# Patient Record
Sex: FEMALE | Race: BLACK OR AFRICAN AMERICAN | Employment: FULL TIME | URBAN - METROPOLITAN AREA
[De-identification: names, ages, dates, MRNs, and addresses within clinical notes are randomized per-mention and may not be internally consistent; named-entity substitution may affect disease eponyms.]

---

## 2017-01-09 PROBLEM — E11.610 TYPE 2 DIABETES MELLITUS WITH DIABETIC NEUROPATHIC ARTHROPATHY, WITHOUT LONG-TERM CURRENT USE OF INSULIN (HCC): Status: ACTIVE | Noted: 2017-01-09

## 2017-12-09 PROCEDURE — 81001 URINALYSIS AUTO W/SCOPE: CPT | Performed by: INTERNAL MEDICINE

## 2017-12-09 PROCEDURE — 87077 CULTURE AEROBIC IDENTIFY: CPT | Performed by: INTERNAL MEDICINE

## 2017-12-09 PROCEDURE — 87086 URINE CULTURE/COLONY COUNT: CPT | Performed by: INTERNAL MEDICINE

## 2017-12-09 PROCEDURE — 87186 SC STD MICRODIL/AGAR DIL: CPT | Performed by: INTERNAL MEDICINE

## 2019-06-24 PROCEDURE — 88175 CYTOPATH C/V AUTO FLUID REDO: CPT | Performed by: INTERNAL MEDICINE

## 2025-07-28 ENCOUNTER — TELEPHONE (OUTPATIENT)
Dept: OTOLARYNGOLOGY | Facility: CLINIC | Age: 55
End: 2025-07-28

## 2025-07-28 ENCOUNTER — OFFICE VISIT (OUTPATIENT)
Dept: OTOLARYNGOLOGY | Facility: CLINIC | Age: 55
End: 2025-07-28

## 2025-07-28 ENCOUNTER — TELEPHONE (OUTPATIENT)
Dept: SURGERY | Facility: CLINIC | Age: 55
End: 2025-07-28

## 2025-07-28 DIAGNOSIS — H61.23 BILATERAL IMPACTED CERUMEN: Primary | ICD-10-CM

## 2025-07-28 DIAGNOSIS — J32.9 CHRONIC SINUSITIS, UNSPECIFIED LOCATION: ICD-10-CM

## 2025-07-28 DIAGNOSIS — R09.81 NASAL CONGESTION: ICD-10-CM

## 2025-07-28 DIAGNOSIS — N18.30 STAGE 3 CHRONIC KIDNEY DISEASE, UNSPECIFIED WHETHER STAGE 3A OR 3B CKD (HCC): ICD-10-CM

## 2025-07-28 PROCEDURE — 99204 OFFICE O/P NEW MOD 45 MIN: CPT | Performed by: STUDENT IN AN ORGANIZED HEALTH CARE EDUCATION/TRAINING PROGRAM

## 2025-07-28 PROCEDURE — 69210 REMOVE IMPACTED EAR WAX UNI: CPT | Performed by: STUDENT IN AN ORGANIZED HEALTH CARE EDUCATION/TRAINING PROGRAM

## 2025-07-28 PROCEDURE — 31231 NASAL ENDOSCOPY DX: CPT | Performed by: STUDENT IN AN ORGANIZED HEALTH CARE EDUCATION/TRAINING PROGRAM

## 2025-07-28 RX ORDER — DOXYCYCLINE 100 MG/1
100 CAPSULE ORAL 2 TIMES DAILY
Qty: 20 CAPSULE | Refills: 0 | Status: SHIPPED | OUTPATIENT
Start: 2025-07-28 | End: 2025-08-07

## 2025-07-28 NOTE — PROGRESS NOTES
Sandra Camacho is a 55 year old female.   Chief Complaint   Patient presents with    Sinus Problem     Patient is here due to sinus congestion , post nasal drip.     Ear Wax     Ear cleaning     HPI:   55-year-old presents with complaints of left ear hearing loss as well as chronic sinus congestion.  Has chronic kidney disease used to be on Benadryl was taken off this by her primary care provider    Current Medications[1]   Past Medical History[2]   Social History:  Short Social Hx on File[3]   Past Surgical History[4]      EXAM:   There were no vitals taken for this visit.    System Details   Skin Inspection - Normal.   Constitutional Overall appearance - Normal.   Head/Face Symmetric, TMJ tenderness not present    Eyes EOMI, PERRL   Right ear:  Canal with cerumen, TM intact, no HEAVENLY   Left ear:  Canal with cerumen, TM intact, no HEAVENLY   Nose: Septum midline, inferior turbinates not enlarged, nasal valves without collapse    Oral cavity/Oropharynx: No lesions or masses on inspection or palpation, tonsils symmetric    Neck: Soft without LAD, thyroid not enlarged  Voice clear/ no stridor   Other:      SCOPES AND PROCEDURES:     Canals:  Left: Canal with cerumen preventing adequate view of TM, debrided with instrumentation  Right: Canal with cerumen preventing adequate view of TM, debrided with instrumentation    Tympanic Membranes:  Left: Normal tympanic membrane.   Right: Normal tympanic membrane.     TM Visualized Method:   Left TM examined via otomicroscopy.    Right TM examined via otomicroscopy.      PROCEDURE:   Removal of cerumen impaction on 7/28/2025  The cerumen impaction was completely removed on the left and right sides using microscopy as necessary.   Removal was completed by using a curette and suction.     Nasal Endoscopy Procedure Note     Due to inability for adequate examination of the nose and nasopharynx and need for magnification to perform the examination, endoscopy was performed.  Risks and  benefits were discussed with patient/family and they have given verbal consent to proceed.    Pre-operative Diagnosis:   1. Bilateral impacted cerumen    2. Nasal congestion    3. Chronic sinusitis, unspecified location    4. Stage 3 chronic kidney disease, unspecified whether stage 3a or 3b CKD (HCC)        Post-operative Diagnosis: Same    Procedure: Diagnostic nasal endoscopy    Anesthesia: Topical anesthetic [place]     Surgeon Lit Boyd MD    EBL: 0cc    Procedure Detail & Findings:     After placement of topical anesthetic intranasally the endoscope was inserted into each nares and driven through the nasal cavity into the nasopharynx. The following findings were noted:    Septum: Midline  Inferior turbinates: Normal  Middle meatus: Patent  Middle turbinates: Normal  Purulence: Bilateral sinonasal cavities  Polyps: None noted  Nasopharynx and eustachian tube: No masses  Other: The middle and superior meatus, the turbinates, and the spheno-ethmoid recess were inspected and seen to be without significant abnormal findings.     Condition: Stable    Complications: Patient tolerated the procedure well with no immediate complication.    Lit Boyd MD    AUDIOGRAM AND IMAGING:         IMPRESSION:   1. Bilateral impacted cerumen    2. Nasal congestion    3. Chronic sinusitis, unspecified location    4. Stage 3 chronic kidney disease, unspecified whether stage 3a or 3b CKD (HCC)       Recommendations:  - Ears cleaned of cerumen today otherwise no abnormalities  - Had purulence within both sinonasal cavities indicating a sinusitis likely chronic  - Will prescribe doxycycline which is a safer antibiotic in the setting of chronic kidney disease  - Discussed Flonase and Astelin nasal spray use as alternatives to her oral therapy  - To follow-up in 2 weeks for repeat evaluation of her sinusitis    The patient indicates understanding of these issues and agrees to the plan.      Lit Boyd MD  7/28/2025  12:38 PM        [1]   Current Outpatient Medications   Medication Sig Dispense Refill    doxycycline 100 MG Oral Cap Take 1 capsule (100 mg total) by mouth 2 (two) times daily for 10 days. 20 capsule 0    amLODIPine 5 MG Oral Tab Take 1 tablet (5 mg total) by mouth daily. 90 tablet 0    hydroCHLOROthiazide 12.5 MG Oral Cap Take 1 capsule (12.5 mg total) by mouth every morning. 90 capsule 0    Desogestrel-Ethinyl Estradiol (ISIBLOOM) 0.15-30 MG-MCG Oral Tab Take 1 tablet by mouth daily. 84 tablet 0    losartan 100 MG Oral Tab Take 1 tablet (100 mg total) by mouth daily. 90 tablet 0    atorvastatin 10 MG Oral Tab Take 1 tablet (10 mg total) by mouth daily. 90 tablet 1    Clobetasol Propionate 0.05 % External Solution Apply twice daily as needed 25 mL 3    Fluticasone Propionate 50 MCG/ACT Nasal Suspension 2 sprays by Nasal route daily. 1 Bottle 11    Albuterol Sulfate  (90 Base) MCG/ACT Inhalation Aero Soln Inhale 1-2 puffs into the lungs every 6 (six) hours as needed for Wheezing or Shortness of Breath. 8.5 g 0    triamcinolone acetonide 0.1 % External Cream Apply topically 2 (two) times daily. 45 g 2    diphenhydrAMINE HCl 50 MG Oral Tab Take 50 mg by mouth 3 (three) times daily.      acetaminophen (TYLENOL EXTRA STRENGTH) 500 MG Oral Tab Take 500 mg by mouth every 6 (six) hours as needed for Pain.      ECHINACEA 1 tablet daily     [2]   Past Medical History:   Asthma (HCC)    mild intermittent    Diabetic Charcot foot (HCC)    DM type 2 (diabetes mellitus, type 2) (HCC)    HTN (hypertension)    HA from labetalol    Personal history of MRSA (methicillin resistant Staphylococcus aureus)    with bacteremia from foot cellulitis    S/P split thickness skin graft Sx:2/13/15 [facility]ANCA 5/13/15   [3]   Social History  Socioeconomic History    Marital status: Single   Tobacco Use    Smoking status: Never    Smokeless tobacco: Never   Vaping Use    Vaping status: Never Used   Substance and Sexual Activity    Alcohol use: Not Currently      Comment: 2/month    Drug use: No   Social History Narrative    Single, works as practice manager/billing and coding. Stationary bike once weekly.         Social Drivers of Health     Food Insecurity: Low Risk  (10/7/2022)    Received from [facility]    Food Insecurity     Have there been times that your food ran out, and you didn't have money to get more?: No     Are there times that you worry that this might happen?: No   Transportation Needs: Low Risk  (10/7/2022)    Received from [facility]    Transportation Needs     Do you have trouble getting transportation to medical appointments?: No   Housing Stability: Low Risk  (10/7/2022)    Received from [facility]    Housing Stability     Are you concerned about having a safe and reliable place to live?: No   [4]   Past Surgical History:  Procedure Laterality Date    Amputation toe,mt-p jt Right 01/19/2015    [facility]    Amputation toe,mt-p jt Left     5th toe    Other surgical history      bilat hip dysplasia repair

## 2025-07-28 NOTE — TELEPHONE ENCOUNTER
Per Dr. Oakley message:    hi all received message from PCP. has possible bladder mass. can we get her in for a visit with me and cystoscopy on same day? I'd like to expedite this perhaps in the next 1-2 weeks.     I called patient and left voicemail, advised her to call us back.

## 2025-07-28 NOTE — TELEPHONE ENCOUNTER
Prior auth approved of in office scope   utrization Number: [id number]  Case Number: [id number]  Review Date: 7/28/2025 2:06:59 PM  Approved Treatment Start Date: 7/28/2025  Expiration Date: 9/26/2025

## 2025-07-28 NOTE — TELEPHONE ENCOUNTER
I called and spoke with patient, verified name and . I called them to offer them an appointment for a procedure. Patient confirmed time and date. Patient aware to come in 30 minutes prior to appointment time. Patient was explained procedure and pre procedure instructions. Patient verbally understood and agreed. Call then ended.

## 2025-08-06 ENCOUNTER — PROCEDURE (OUTPATIENT)
Dept: SURGERY | Facility: CLINIC | Age: 55
End: 2025-08-06

## 2025-08-06 VITALS — SYSTOLIC BLOOD PRESSURE: 122 MMHG | RESPIRATION RATE: 16 BRPM | DIASTOLIC BLOOD PRESSURE: 71 MMHG | HEART RATE: 83 BPM

## 2025-08-06 DIAGNOSIS — N32.89 BLADDER MASS: Primary | ICD-10-CM

## 2025-08-06 DIAGNOSIS — R31.0 GROSS HEMATURIA: ICD-10-CM

## 2025-08-06 LAB
BILIRUB UR QL: NEGATIVE
CLARITY UR: CLEAR
COLOR UR: COLORLESS
GLUCOSE UR-MCNC: NORMAL MG/DL
HGB UR QL STRIP.AUTO: NEGATIVE
KETONES UR-MCNC: NEGATIVE MG/DL
LEUKOCYTE ESTERASE UR QL STRIP.AUTO: 75
NITRITE UR QL STRIP.AUTO: NEGATIVE
PH UR: 6 (ref 5–8)
PROT UR-MCNC: NEGATIVE MG/DL
SP GR UR STRIP: 1.01 (ref 1–1.03)
UROBILINOGEN UR STRIP-ACNC: NORMAL

## 2025-08-06 PROCEDURE — 52000 CYSTOURETHROSCOPY: CPT | Performed by: UROLOGY

## 2025-08-06 PROCEDURE — 99203 OFFICE O/P NEW LOW 30 MIN: CPT | Performed by: UROLOGY

## 2025-08-07 ENCOUNTER — RESULTS FOLLOW-UP (OUTPATIENT)
Dept: SURGERY | Facility: CLINIC | Age: 55
End: 2025-08-07